# Patient Record
Sex: FEMALE | Race: WHITE | NOT HISPANIC OR LATINO | URBAN - METROPOLITAN AREA
[De-identification: names, ages, dates, MRNs, and addresses within clinical notes are randomized per-mention and may not be internally consistent; named-entity substitution may affect disease eponyms.]

---

## 2024-05-23 DIAGNOSIS — S92.109A: Primary | ICD-10-CM

## 2024-05-24 ENCOUNTER — HOSPITAL ENCOUNTER (OUTPATIENT)
Dept: RADIOLOGY | Facility: HOSPITAL | Age: 59
Discharge: HOME | End: 2024-05-24
Attending: STUDENT IN AN ORGANIZED HEALTH CARE EDUCATION/TRAINING PROGRAM
Payer: MEDICAID

## 2024-05-24 ENCOUNTER — OFFICE VISIT (OUTPATIENT)
Dept: ORTHOPEDICS | Facility: CLINIC | Age: 59
End: 2024-05-24
Payer: MEDICAID

## 2024-05-24 VITALS
HEART RATE: 87 BPM | WEIGHT: 154.31 LBS | SYSTOLIC BLOOD PRESSURE: 139 MMHG | BODY MASS INDEX: 25.71 KG/M2 | HEIGHT: 65 IN | DIASTOLIC BLOOD PRESSURE: 80 MMHG

## 2024-05-24 DIAGNOSIS — S92.109A: ICD-10-CM

## 2024-05-24 DIAGNOSIS — S92.109A: Primary | ICD-10-CM

## 2024-05-24 PROCEDURE — 99213 OFFICE O/P EST LOW 20 MIN: CPT | Mod: PBBFAC,25

## 2024-05-24 PROCEDURE — 73610 X-RAY EXAM OF ANKLE: CPT | Mod: TC,LT

## 2024-05-24 PROCEDURE — 73630 X-RAY EXAM OF FOOT: CPT | Mod: TC,LT

## 2024-05-24 RX ORDER — PIOGLITAZONEHYDROCHLORIDE 30 MG/1
1 TABLET ORAL DAILY
COMMUNITY

## 2024-05-24 RX ORDER — DICLOFENAC SODIUM 50 MG/1
50 TABLET, DELAYED RELEASE ORAL 2 TIMES DAILY PRN
COMMUNITY
Start: 2024-05-17

## 2024-05-24 RX ORDER — METFORMIN HYDROCHLORIDE 500 MG/1
1 TABLET ORAL 2 TIMES DAILY
COMMUNITY

## 2024-05-24 RX ORDER — METHOCARBAMOL 500 MG/1
1000 TABLET, FILM COATED ORAL 4 TIMES DAILY
COMMUNITY
Start: 2024-05-17

## 2024-05-24 NOTE — LETTER
May 24, 2024      [facility]  Critical access hospital0 [facility] [address]  Phone: [phone]       Patient: Sandra Birmingham   YOB: 1965  Date of Visit: 05/24/2024    To Whom It May Concern:    Natasha Birmingham  was at [facility] on 05/24/2024. The patient may return to work/school on 05/27/2024 with restrictions. Light duty for the next 4 weeks. If you have any questions or concerns, or if I can be of further assistance, please do not hesitate to contact me.    Sincerely,    Heriberto Jasso MD

## 2024-05-24 NOTE — PROGRESS NOTES
"Subjective:    Patient ID: Sandra Birmingham is a 58 y.o. female  who presented to [facility] for new visit.      Chief Complaint: Pain of the Left Ankle, Pain of the Left Foot, and Pain of the Right Ankle      History of Present Illness:    Sandra Birmingham presented today with left ankle pain that began 05/20/2024.  She was at her daughter's graduation was helping her daughter get out of the car when she slipped and fell in possibly a pot hole in the parking lot of the [employer].  She had an inversion injury of the left ankle and also her left ankle.  She was seen in the ED, imaging showed talar avulsion fracture, she was placed in a boot, given diclofenac p.o. and methocarbamol p.o. and referred to [facility] ortho.  Today pain is 8/10, located at the dorsal anterior talotibial joint.  Worse with walking.  Anti-inflammatories provide moderate relief.    Ankle/Foot Review of Systems:  Swelling?  no  Instability?  no  Mechanical sx?  no  <30 min AM stiffness? no  Limited ROM? yes  Fever/Chills? no       Objective:      Physical Exam:    /80   Pulse 87   Ht 5' 5" (1.651 m)   Wt 70 kg (154 lb 5.2 oz)   BMI 25.68 kg/m²     Ortho/SPM Exam    Appearance:  antalgic  FWB    Soft tissue swelling: Left: no Right: no  Effusion: Left:  Negative Right: Negative  Erythema: Left no Right: no  Ecchymosis: Left: no Right: no  Atrophy: Left: no Right: no    Palpation:  Ankle/Foot Tenderness: Left: non tender Right:  Anterior talonavicular joint.    Range of motion:  Ankle dorsiflexion (20 degrees):     Left: 20 Right: 20  Ankle Plantar flexion (50 degrees): Left 50 Right: 50  Subtalar inversion (5 degrees): Left: 5 Right 5  Subtalar eversion (5 degrees):  Left: 5 Right 5  Transverse/midtarsal: adduction (20 degrees): Left: 20 Right: 20  Transverse/midtarsal abduction (10 degrees): Left: 10 Right: 10    Strength:  Foot inversion/dorsiflexion (Deep Peroneal N. L4):Left: 5/5  Pain: " no Right: 5/5  Pain: yes  1st toe Dorsiflexion (Deep Peroneal N. L5): Left: 5/5  Pain: no Right: 5/5  Pain: no  Foot plantarflexion (Tibial N. S1): Left: 5/5  Pain: no Right: 5/5  Pain: no  Foot eversion (superficial peroneal L4-S1): Left: 5/5  Pain: no Right: 5/5  Pain: no      Special Tests:  Valles:  Left: Not performed     Right: Not performed   Anterior drawer: Left: Negative     Right: Negative   Talar tilt: Left: Negative      Right: Negative   External rotation stress (Kleiger's): Left: Negative  Right: Negative  Eversion stress: Left: Negative Right: Negative   Squeeze: Left: Negative  Right: Negative  Heel rise: Left: Not performed Right: Not performed  Too many toes sign: Left: Not performed Right: Not performed      General appearance: NAD  Peripheral pulses: normal bilaterally   Reflexes: Left: normal Right normal   Sensation: normal    Labs:  Last A1c: The patient doesn't have any registry metric data available     Imaging:   Previous images reviewed.  X-rays ordered and performed today: yes  # of views: 3 Laterality: left  My Interpretation:  Small soft tissue calcification at the distal superior anterior talus, could signify ligamentous injury versus previous enthesophyte.  No fractures or dislocations.      Assessment:        Encounter Diagnoses   Code Name Primary?    S92.109A Closed fracture of talus Yes        Plan:      MDM: Prior external referring provider notes reviewed. Prior external referring provider studies reviewed.   Dx:  Left ankle sprain, acute in moderate exacerbation.   Treatment Plan: Discussed with patient diagnosis, prognosis, and treatment recommendations. Education provided.    Recommend conservative treatment to include: avoidance of aggravating activity, significant modification of daily activities, hot/cold therapies, topical and oral medications, braces, HEP/PT/OT, and injections.   Rice 2 weeks  Walking boot 2-4 weeks.    Gentle range of motion as soon as pain allows.   Weeks 2-4.  Patient should be working towards full weight-bearing in the walking boot during this time period.  Ankle Brace for 2-4 weeks.  MRI if no improvement after 2 months  Imaging: radiological studies ordered and independently reviewed; discussed with patient; pending radiologist interpretation.   Weight Management: is paramount. Recommend at least 10 pounds weight loss if your BMI is 25-29.9. A BMI of <24.9 may provide further relief..   Activity: Activity as tolerated; HEP to include aerobic conditioning and strength training with non-painful activity. ROM/STG exercises. Proper footware; assistive devises to avoid limping.   Therapy:  Home range-of-motion exercises, demonstrated today.  Medication: CONTINUE previously prescribed medication see list . Please see your primary care physician for further refills.  RTC:  6 weeks.      Heriberto Jasso MD.  Note dictated using MModal.

## 2024-06-11 NOTE — PROGRESS NOTES
Faculty Attestation: Sandra Birmingham  was seen in Sports Medicine Clinic. Discussed with Dr. Jasso at the time of the visit. History of Present Illness, Physical Exam, and Assessment and Plan reviewed. Treatment plan is reasonable and appropriate. Compliance with treatment recommendations is important.  Radiology images independently reviewed and agree with radiologist interpretation.  No procedure was performed.     Nelly Crowley MD  Sports Medicine

## 2024-07-18 ENCOUNTER — OFFICE VISIT (OUTPATIENT)
Dept: ORTHOPEDICS | Facility: CLINIC | Age: 59
End: 2024-07-18
Payer: MEDICAID

## 2024-07-18 VITALS
WEIGHT: 155.88 LBS | HEIGHT: 65 IN | BODY MASS INDEX: 25.97 KG/M2 | HEART RATE: 77 BPM | DIASTOLIC BLOOD PRESSURE: 82 MMHG | TEMPERATURE: 98 F | SYSTOLIC BLOOD PRESSURE: 136 MMHG

## 2024-07-18 DIAGNOSIS — S93.402D INVERSION SPRAIN OF LEFT ANKLE, SUBSEQUENT ENCOUNTER: Primary | ICD-10-CM

## 2024-07-18 DIAGNOSIS — M75.101 ROTATOR CUFF SYNDROME OF RIGHT SHOULDER: ICD-10-CM

## 2024-07-18 PROCEDURE — 99213 OFFICE O/P EST LOW 20 MIN: CPT | Mod: PBBFAC

## 2024-07-18 RX ORDER — BENAZEPRIL HYDROCHLORIDE 5 MG/1
5 TABLET ORAL
COMMUNITY

## 2024-07-18 NOTE — PROGRESS NOTES
"Subjective:    Patient ID: Sandra Birmingham is a 59 y.o. female  who presented to [facility] for follow up.      Chief Complaint: Pain of the Left Ankle      History of Present Illness:     Sandra Birmingham presented today for follow up of left lateral ankle sprain and right shoulder pain. She has not transitioned to an ankle brace CS she has been wearing her boot.  She states overall her ankle pain has improved significantly, still slightly tender over the lateral ankle, pain 3-4/10.  Denies any new injuries.  She also complains of posterior shoulder pain, 3/10, worse on abduction and external rotation.  States she injured it at the same time she injured her ankle when she fell she grabbed onto the car door handle.    Ankle/Foot Review of Systems:  Swelling?  no  Instability?  no  Mechanical sx?  no  <30 min AM stiffness? no  Limited ROM? no  Fever/Chills? no       Objective:      Physical Exam:    /82   Pulse 77   Temp 97.7 °F (36.5 °C)   Ht 5' 5" (1.651 m)   Wt 70.7 kg (155 lb 13.8 oz)   BMI 25.94 kg/m²     Ortho/SPM Exam    Appearance:  Normal gait/station  FWB      Soft tissue swelling: Left: no Right: no  Effusion: Left:  Negative Right: Negative  Erythema: Left no Right: no  Ecchymosis: Left: no Right: no  Atrophy: Left: no Right: no    Palpation:  Ankle/Foot Tenderness: Left: anterior talofibular ligament (ATFL) Right: non tender.  Right shoulder:  Posterior deltoid.    Range of motion:  Full range of motion of the right shoulder on abduction, flexion internal and external rotation.  Ankle dorsiflexion (20 degrees):     Left: 20 Right: 20  Ankle Plantar flexion (50 degrees): Left 50 Right: 50  Subtalar inversion (5 degrees): Left: 5 Right 5  Subtalar eversion (5 degrees):  Left: 5 Right 5  Transverse/midtarsal: adduction (20 degrees): Left: 20 Right: 20  Transverse/midtarsal abduction (10 degrees): Left: 10 Right: 10    Strength:  Right " Shoulder strength: 5/5 flexion abduction, internal and external rotation.  She does have pain on resisted external rotation.  Foot inversion/dorsiflexion (Deep Peroneal N. L4):Left: 5/5  Pain: no Right: 5/5  Pain: no  1st toe Dorsiflexion (Deep Peroneal N. L5): Left: 5/5  Pain: no Right: 5/5  Pain: no  Foot plantarflexion (Tibial N. S1): Left: 5/5  Pain: no Right: 5/5  Pain: no  Foot eversion (superficial peroneal L4-S1): Left: 5/5  Pain: no Right: 5/5  Pain: no    Special Tests:  Right shoulder:   Positive: Horn blower; juanito's pain lateralizes to the posterior shoulder.  Negative [place], cross-body abduction, Speed's, Drop-arm, [place]'s.    Left ankle:  Valles:  Left: Not performed     Right: Not performed   Anterior drawer: Left: Negative     Right: Negative   Talar tilt: Left: Positive      Right: Negative   External rotation stress (Kleiger's): Left: Negative  Right: Negative  Eversion stress: Left: Negative Right: Negative   Squeeze: Left: Negative  Right: Negative  Heel rise: Left: Not performed Right: Not performed  Too many toes sign: Left: Not performed Right: Not performed      General appearance: NAD  Peripheral pulses: normal bilaterally   Reflexes: Left: normal Right normal   Sensation: normal    Labs:  Last A1c: The patient doesn't have any registry metric data available     Imaging:   Previous images reviewed.  X-rays ordered and performed today: no  Assessment:        Encounter Diagnoses   Code Name Primary?    S93.402D Inversion sprain of left ankle, subsequent encounter Yes    M75.101 Rotator cuff syndrome of right shoulder         Plan:      Dx:  Left lateral ankle sprain, resolving.  Right rotator cuff syndrome, new problem.  Treatment Plan: Discussed with patient diagnosis, prognosis, and treatment recommendations. Education provided.  Recommend conservative treatment to include: avoidance of aggravating activity, significant modification of daily activities, hot/cold therapies, topical and  oral medications, braces, HEP/PT/OT, and injections.   For the left lateral ankle sprain, recommend transitioning to an ankle brace for the next month and then weaning out of it over with a 2 weeks after that.  Follow up p.r.n.  For the right rotator cuff syndrome, suspect infraspinatus verse teres minor, symptoms are mild, strength and range of motion are preserved.  Recommend home exercise program.  Follow up p.r.n..  Imaging: prior radiological studies independently reviewed; discussed with patient; agree with radiologist interpretation.   Weight Management: is paramount. Recommend at least 10 pounds weight loss if your BMI is 25-29.9. A BMI of <24.9 may provide further relief..   Procedure:  Consider shoulder CSI in the future if no improvement with conservative management.  Activity: Activity as tolerated; HEP to include aerobic conditioning and strength training with non-painful activity. ROM/STG exercises. Proper footware; assistive devises to avoid limping.   Therapy:  Home exercise program.  Medication: CONTINUE previously prescribed medication see list. . Please see your primary care physician for further refills.  RTC: PRN; call if any issues.      Heriberto Jasso MD.  Note dictated using MModal.

## 2024-07-19 NOTE — PROGRESS NOTES
Faculty Attestation: Sandra Birmingham  was seen in Sports Medicine Clinic. Discussed with Dr. Jasso at the time of the visit. History of Present Illness, Physical Exam, and Assessment and Plan reviewed. Treatment plan is reasonable and appropriate. Compliance with treatment recommendations is important.  Radiology images independently reviewed and agree with radiologist interpretation. No procedure was performed.     Aicha Alaniz MD  Sports Medicine      Patient Type: Established patient to [facility]  Problem Type: New condition to [facility]  Condition: Acute Condition - recent or new complicated injury/illness (4)

## 2025-02-25 ENCOUNTER — ANESTHESIA EVENT (OUTPATIENT)
Dept: SURGERY | Facility: HOSPITAL | Age: 60
End: 2025-02-25
Payer: MEDICAID

## 2025-02-25 RX ORDER — PHENYLEPHRINE HYDROCHLORIDE 25 MG/ML
1 SOLUTION/ DROPS OPHTHALMIC
OUTPATIENT
Start: 2025-02-25 | End: 2025-02-25

## 2025-02-25 RX ORDER — ACETAMINOPHEN 500 MG
1000 TABLET ORAL
OUTPATIENT
Start: 2025-02-25 | End: 2025-02-25

## 2025-02-25 RX ORDER — TROPICAMIDE 10 MG/ML
1 SOLUTION/ DROPS OPHTHALMIC
OUTPATIENT
Start: 2025-02-25 | End: 2025-02-25

## 2025-02-25 RX ORDER — CYCLOPENTOLATE HYDROCHLORIDE 10 MG/ML
1 SOLUTION/ DROPS OPHTHALMIC
OUTPATIENT
Start: 2025-02-25 | End: 2025-02-25

## 2025-02-25 NOTE — ANESTHESIA PREPROCEDURE EVALUATION
02/25/2025  Sandra Birmingham is a 59 y.o., female.      Pre-op Assessment    I have reviewed the Patient Summary Reports.     I have reviewed the Nursing Notes. I have reviewed the NPO Status.   I have reviewed the Medications.     Review of Systems  Anesthesia Hx:  No problems with previous Anesthesia   History of prior surgery of interest to airway management or planning:  Previous anesthesia: General           Hematology/Oncology:  Hematology Normal   Oncology Normal                                   EENT/Dental:   Right retinal detachment Eyes:             Eye Disease:  Diabetic Retinopathy                   Cardiovascular:  Exercise tolerance: good   Hypertension              ECG has been reviewed.                      Hypertension, Essential Hypertension Denies Accelerated Hypertension         Pulmonary:  Pulmonary Normal                       Renal/:  Renal/ Normal                 Hepatic/GI:  Hepatic/GI Normal                    Musculoskeletal:  Musculoskeletal Normal                Neurological:  Neurology Normal                                      Endocrine:  Diabetes, type 2    Diabetes, Type 2 Diabetes                      Dermatological:  Skin Normal    Psych:  Psychiatric Normal                    Physical Exam  General: Well nourished, Cooperative, Alert and Oriented    Airway:  Mallampati: II   Mouth Opening: Normal  TM Distance: Normal  Tongue: Normal  Neck ROM: Normal ROM    Dental:  Intact    Chest/Lungs:  Clear to auscultation, Normal Respiratory Rate    Heart:  Rate: Normal  Rhythm: Regular Rhythm        Anesthesia Plan  Type of Anesthesia, risks & benefits discussed:    Anesthesia Type: Gen Supraglottic Airway  Intra-op Monitoring Plan: Standard ASA Monitors  Post Op Pain Control Plan: multimodal analgesia and IV/PO Opioids PRN  Induction:  IV  Airway Plan: ,  Post-Induction  Informed Consent: Informed consent signed with the Patient and all parties understand the risks and agree with anesthesia plan.  All questions answered. Patient consented to blood products? No  ASA Score: 3  Day of Surgery Review of History & Physical: H&P Update referred to the surgeon/provider.I have interviewed and examined the patient. I have reviewed the patient's H&P dated: 3/13/2025. There are no significant changes.     Ready For Surgery From Anesthesia Perspective.     .

## 2025-02-27 ENCOUNTER — ANESTHESIA (OUTPATIENT)
Dept: SURGERY | Facility: HOSPITAL | Age: 60
End: 2025-02-27
Payer: MEDICAID

## 2025-03-13 ENCOUNTER — HOSPITAL ENCOUNTER (OUTPATIENT)
Facility: HOSPITAL | Age: 60
Discharge: HOME | End: 2025-03-13
Attending: OPHTHALMOLOGY | Admitting: OPHTHALMOLOGY
Payer: MEDICAID

## 2025-03-13 DIAGNOSIS — R94.39 ABNORMAL NUCLEAR STRESS TEST: ICD-10-CM

## 2025-03-13 LAB
POCT GLUCOSE: 135 MG/DL (ref 70–110)
POCT GLUCOSE: 141 MG/DL (ref 70–110)

## 2025-03-13 PROCEDURE — 25000003 PHARM REV CODE 250: Performed by: OPHTHALMOLOGY

## 2025-03-13 PROCEDURE — 82962 GLUCOSE BLOOD TEST: CPT | Performed by: OPHTHALMOLOGY

## 2025-03-13 PROCEDURE — 71000016 HC POSTOP RECOV ADDL HR: Performed by: OPHTHALMOLOGY

## 2025-03-13 PROCEDURE — 63600175 PHARM REV CODE 636 W HCPCS: Performed by: OPHTHALMOLOGY

## 2025-03-13 PROCEDURE — 63600175 PHARM REV CODE 636 W HCPCS: Performed by: NURSE ANESTHETIST, CERTIFIED REGISTERED

## 2025-03-13 PROCEDURE — 71000033 HC RECOVERY, INTIAL HOUR: Performed by: OPHTHALMOLOGY

## 2025-03-13 PROCEDURE — 37000008 HC ANESTHESIA 1ST 15 MINUTES: Performed by: OPHTHALMOLOGY

## 2025-03-13 PROCEDURE — 25000003 PHARM REV CODE 250: Performed by: NURSE ANESTHETIST, CERTIFIED REGISTERED

## 2025-03-13 PROCEDURE — 37000009 HC ANESTHESIA EA ADD 15 MINS: Performed by: OPHTHALMOLOGY

## 2025-03-13 PROCEDURE — 27201423 OPTIME MED/SURG SUP & DEVICES STERILE SUPPLY: Performed by: OPHTHALMOLOGY

## 2025-03-13 PROCEDURE — 36000707: Performed by: OPHTHALMOLOGY

## 2025-03-13 PROCEDURE — 71000015 HC POSTOP RECOV 1ST HR: Performed by: OPHTHALMOLOGY

## 2025-03-13 PROCEDURE — 25000003 PHARM REV CODE 250

## 2025-03-13 PROCEDURE — 36000706: Performed by: OPHTHALMOLOGY

## 2025-03-13 RX ORDER — ONDANSETRON HYDROCHLORIDE 2 MG/ML
INJECTION, SOLUTION INTRAVENOUS
Status: DISCONTINUED | OUTPATIENT
Start: 2025-03-13 | End: 2025-03-13

## 2025-03-13 RX ORDER — TRIAMCINOLONE ACETONIDE 40 MG/ML
INJECTION, SUSPENSION INTRA-ARTICULAR; INTRAMUSCULAR
Status: DISCONTINUED | OUTPATIENT
Start: 2025-03-13 | End: 2025-03-13 | Stop reason: HOSPADM

## 2025-03-13 RX ORDER — HYDROCODONE BITARTRATE AND ACETAMINOPHEN 5; 325 MG/1; MG/1
1 TABLET ORAL
Status: DISCONTINUED | OUTPATIENT
Start: 2025-03-13 | End: 2025-03-13 | Stop reason: HOSPADM

## 2025-03-13 RX ORDER — KETOROLAC TROMETHAMINE 30 MG/ML
30 INJECTION, SOLUTION INTRAMUSCULAR; INTRAVENOUS ONCE
Status: COMPLETED | OUTPATIENT
Start: 2025-03-13 | End: 2025-03-13

## 2025-03-13 RX ORDER — MEPERIDINE HYDROCHLORIDE 25 MG/ML
12.5 INJECTION INTRAMUSCULAR; INTRAVENOUS; SUBCUTANEOUS ONCE AS NEEDED
Status: DISCONTINUED | OUTPATIENT
Start: 2025-03-13 | End: 2025-03-13 | Stop reason: HOSPADM

## 2025-03-13 RX ORDER — MIDAZOLAM HYDROCHLORIDE 1 MG/ML
INJECTION INTRAMUSCULAR; INTRAVENOUS
Status: DISCONTINUED | OUTPATIENT
Start: 2025-03-13 | End: 2025-03-13

## 2025-03-13 RX ORDER — SODIUM CHLORIDE 9 MG/ML
INJECTION, SOLUTION INTRAVENOUS CONTINUOUS
Status: DISCONTINUED | OUTPATIENT
Start: 2025-03-13 | End: 2025-03-13 | Stop reason: HOSPADM

## 2025-03-13 RX ORDER — ACETAMINOPHEN 500 MG
1000 TABLET ORAL EVERY 4 HOURS PRN
Status: DISCONTINUED | OUTPATIENT
Start: 2025-03-13 | End: 2025-03-13 | Stop reason: HOSPADM

## 2025-03-13 RX ORDER — HYDROCODONE BITARTRATE AND ACETAMINOPHEN 7.5; 325 MG/1; MG/1
1 TABLET ORAL EVERY 4 HOURS PRN
Status: DISCONTINUED | OUTPATIENT
Start: 2025-03-13 | End: 2025-03-13 | Stop reason: HOSPADM

## 2025-03-13 RX ORDER — TOBRAMYCIN AND DEXAMETHASONE 3; 1 MG/ML; MG/ML
SUSPENSION/ DROPS OPHTHALMIC
Status: DISCONTINUED | OUTPATIENT
Start: 2025-03-13 | End: 2025-03-13 | Stop reason: HOSPADM

## 2025-03-13 RX ORDER — FENTANYL CITRATE 50 UG/ML
25 INJECTION, SOLUTION INTRAMUSCULAR; INTRAVENOUS EVERY 5 MIN PRN
Status: DISCONTINUED | OUTPATIENT
Start: 2025-03-13 | End: 2025-03-13 | Stop reason: HOSPADM

## 2025-03-13 RX ORDER — GLUCAGON 1 MG
1 KIT INJECTION
Status: DISCONTINUED | OUTPATIENT
Start: 2025-03-13 | End: 2025-03-13 | Stop reason: HOSPADM

## 2025-03-13 RX ORDER — ONDANSETRON HYDROCHLORIDE 2 MG/ML
4 INJECTION, SOLUTION INTRAVENOUS DAILY PRN
Status: DISCONTINUED | OUTPATIENT
Start: 2025-03-13 | End: 2025-03-13 | Stop reason: HOSPADM

## 2025-03-13 RX ORDER — FENTANYL CITRATE 50 UG/ML
INJECTION, SOLUTION INTRAMUSCULAR; INTRAVENOUS
Status: DISCONTINUED | OUTPATIENT
Start: 2025-03-13 | End: 2025-03-13

## 2025-03-13 RX ORDER — POVIDONE-IODINE 5 %
SOLUTION, NON-ORAL OPHTHALMIC (EYE)
Status: DISCONTINUED | OUTPATIENT
Start: 2025-03-13 | End: 2025-03-13 | Stop reason: HOSPADM

## 2025-03-13 RX ORDER — DIPHENHYDRAMINE HYDROCHLORIDE 50 MG/ML
12.5 INJECTION, SOLUTION INTRAMUSCULAR; INTRAVENOUS ONCE AS NEEDED
Status: DISCONTINUED | OUTPATIENT
Start: 2025-03-13 | End: 2025-03-13 | Stop reason: HOSPADM

## 2025-03-13 RX ORDER — INDOCYANINE GREEN AND WATER 25 MG
KIT INJECTION
Status: DISCONTINUED | OUTPATIENT
Start: 2025-03-13 | End: 2025-03-13 | Stop reason: HOSPADM

## 2025-03-13 RX ORDER — LIDOCAINE HYDROCHLORIDE 20 MG/ML
INJECTION INTRAVENOUS
Status: DISCONTINUED | OUTPATIENT
Start: 2025-03-13 | End: 2025-03-13

## 2025-03-13 RX ORDER — ACETAMINOPHEN 500 MG
1000 TABLET ORAL
Status: COMPLETED | OUTPATIENT
Start: 2025-03-13 | End: 2025-03-13

## 2025-03-13 RX ORDER — PROMETHAZINE HYDROCHLORIDE 25 MG/ML
25 INJECTION, SOLUTION INTRAMUSCULAR; INTRAVENOUS EVERY 6 HOURS PRN
Status: DISCONTINUED | OUTPATIENT
Start: 2025-03-13 | End: 2025-03-13 | Stop reason: HOSPADM

## 2025-03-13 RX ORDER — CYCLOPENTOLATE HYDROCHLORIDE 10 MG/ML
1 SOLUTION/ DROPS OPHTHALMIC
Status: COMPLETED | OUTPATIENT
Start: 2025-03-13 | End: 2025-03-13

## 2025-03-13 RX ORDER — TROPICAMIDE 10 MG/ML
1 SOLUTION/ DROPS OPHTHALMIC
Status: COMPLETED | OUTPATIENT
Start: 2025-03-13 | End: 2025-03-13

## 2025-03-13 RX ORDER — PHENYLEPHRINE HYDROCHLORIDE 25 MG/ML
1 SOLUTION/ DROPS OPHTHALMIC
Status: COMPLETED | OUTPATIENT
Start: 2025-03-13 | End: 2025-03-13

## 2025-03-13 RX ORDER — PROPOFOL 10 MG/ML
VIAL (ML) INTRAVENOUS
Status: DISCONTINUED | OUTPATIENT
Start: 2025-03-13 | End: 2025-03-13

## 2025-03-13 RX ADMIN — TROPICAMIDE 1 DROP: 10 SOLUTION/ DROPS OPHTHALMIC at 06:03

## 2025-03-13 RX ADMIN — FENTANYL CITRATE 25 MCG: 50 INJECTION, SOLUTION INTRAMUSCULAR; INTRAVENOUS at 07:03

## 2025-03-13 RX ADMIN — KETOROLAC TROMETHAMINE 30 MG: 30 INJECTION, SOLUTION INTRAMUSCULAR at 08:03

## 2025-03-13 RX ADMIN — SODIUM CHLORIDE: 9 INJECTION, SOLUTION INTRAVENOUS at 06:03

## 2025-03-13 RX ADMIN — CYCLOPENTOLATE HYDROCHLORIDE 1 DROP: 10 SOLUTION/ DROPS OPHTHALMIC at 06:03

## 2025-03-13 RX ADMIN — ACETAMINOPHEN 1000 MG: 500 TABLET ORAL at 06:03

## 2025-03-13 RX ADMIN — LIDOCAINE HYDROCHLORIDE 50 MG: 20 INJECTION, SOLUTION INTRAVENOUS at 07:03

## 2025-03-13 RX ADMIN — PHENYLEPHRINE HYDROCHLORIDE 1 DROP: 25 SOLUTION/ DROPS OPHTHALMIC at 06:03

## 2025-03-13 RX ADMIN — MIDAZOLAM 2 MG: 1 INJECTION INTRAMUSCULAR; INTRAVENOUS at 07:03

## 2025-03-13 RX ADMIN — ONDANSETRON 4 MG: 2 INJECTION INTRAMUSCULAR; INTRAVENOUS at 07:03

## 2025-03-13 RX ADMIN — PROPOFOL 120 MG: 10 INJECTION, EMULSION INTRAVENOUS at 07:03

## 2025-03-13 NOTE — DISCHARGE INSTRUCTIONS
Scleral Buckle//Vitrectomy  Care After    Refer to this sheet in the next few weeks. These instructions provide you with information on caring for yourself after your procedure. Your caregiver may also give you more specific instructions. Your treatment has been planned according to current medical practices, but problems sometimes occur. Call your caregiver if you have any problems or questions after your procedure.    Home Care Instructions  If you are instructed to stay in a certain position for a certain amount of time, it is important to do so. Positions may include sitting up or lying on either side. The reasons for certain positioning depends on the reason you had the surgery and the type of vitrectomy performed. Ask your caregiver if you have to remain in a certain position for a certain amount of time.    Wear your eye patch for the first day. Dr. Ennis will remove the patch the next morning after surgery.  Keep the area around your eye clean and dry.  Put any eyedrops in your eyes as directed by your caregiver after seeing Dr. Ennis the day after surgery: TOBRADEX- one drop to affected eye 4 times a day, ex: with breakfast, lunch, dinner, and before bed.    Wear the plastic shield over your eye when you sleep for the first day. This is to protect the eye from being accidentally bumped or jabbed, or having too much pressure put on it.  Avoid any strenuous physical activity for as long as directed by your caregiver. This includes bending over, lifting anything over 5 pounds (2.3 kg), or straining. Talk to your caregiver about when it may be safe to resume sexual activity.  If any of your regular medicines were stopped before surgery, carefully follow your caregiver's instructions about which medicines to restart and when to restart them. You may be instructed not to use blood thinners or aspirin.  Continue with your normal diet unless directed otherwise by your caregiver. If you are diabetic, stay on your  "diabetic diet, or use your insulin as directed by your caregiver.  It is okay to use your other eye for reading and watching television.  Do not drive a car or use contact lenses until your caregiver says it is okay.      Dr. Ennis will remove patch the morning after surgery and will begin the following medication as instructed.    Medications:  Tobradex- 1 drop in operative eye, 4 times a day (sTART fRIDAY 03/14/25 AFTER DR. ENNIS VISIT    After surgery instructions:  Maintain the following positional restrictions until cleared by your surgeon: LIE ON LEFT SIDE WITH HEAD FACING LEFT OR FACE DOWN.      Avoid any heavy lifting, strenuous activity, or driving until cleared to do so by your surgeon.  Avoid any "dirty" activities which may lead to eye infection, such as gardening or scrubbing floors.  Do not rub the operative eye.    Notify your surgeon of any:  signs of bleeding or infection  excessive pain or nausea that is not relieved with medication  worsening or sudden loss of vision    SEEK MEDICAL CARE IF:  Your eye becomes very red or painful.  You develop any pus or discharge from the eye.  You have chills.  Your eyelids on either eye become swollen or stuck shut.    SEEK IMMEDIATE MEDICAL CARE IF:    You have a fever.    You notice a change in vision in either eye.    You see more floaters or spots in front of your vision.    Part of your side vision is black and you cannot see through it. It may feel like a shade is being pulled toward the center of your vision from any direction-Leave patch on tonight. Keep it clean and dry.   WEAR ARRON BRACELET AND KEEP ARRON CARD WITH YOU.  "

## 2025-03-13 NOTE — OP NOTE
[facility]                         [address] [address]     PATIENT NAME: Sandra Birmingham   YOB: 1965   CSN: [id number]        MRN: [id number]   ADMIT DATE: 3/13/2025     PHYSICIAN:         Piter Ennis MD                           OPERATIVE REPORT     DATE OF SURGERY:03/13/2025       SURGEON:  Piter Ennis MD     PREOPERATIVE DIAGNOSIS:  Full Thickness Macular Hole Right Eye     POSTOPERATIVE DIAGNOSIS:  Full Thickness Macular Hole Right Eye    PROCEDURE:  Pars plana vitrectomy with internal limiting membrane peel right eye.     ANESTHESIA:  General.     ESTIMATED BLOOD LOSS:  Less than 5 cc.     COMPLICATIONS:  None.     PROCEDURE INDICATIONS: Sandra Birmingham  has a history of a Full Thickness Macular Hole Right Eye.     PROCEDURE DESCRIPTION:  The patient was taken to the operative theater where   general anesthesia was begun.  The right eye was prepped and draped in the   normal sterile fashion and a lid speculum applied.  A standard 3-port 25-gauge   pars plana vitrectomy was performed with all trocars placed 3.5 mm from the   surgical limbus.  A core vitrectomy was performed, including removal of the   posterior hyaloid out to the peripheral retina.  Kenalog was injected into the vitreous cavity to aid in visualization   of the vitreous and a posterior vitreous detachment was created using the vitrectomy cutter.  ICG dye was used to stain the internal limiting membrane.  The membrane was the peeled from the macular surface using an ILM forcep.   The peripheral retina was examined with scleral depression and no retinal breaks were identified.   A fluid air exchange was the performed followed by an air gas exchange with 20%SF6 gas.  All instruments were removed from the eye and all sclerotomies   were massaged closed with cotton tip swabs.  Several drops of TobraDex ophthalmic  solution were   applied to the eye.  The postoperative intraocular pressure was 15 mmHg.  The   lid speculum and eye drape were then removed, and the eye was covered with a   gauze patch and a Bradford shield.  The patient was then transported to the   postoperative care unit for recovery.     DISCHARGE CONDITION:  Good.     DISPOSITION:  Home with followup with Dr. Ennis the following day.  This patient   tolerated the procedure well.     Sandra Birmingham is discharged to home.           ______________________________  Piter Ennis MD

## 2025-03-13 NOTE — ANESTHESIA PROCEDURE NOTES
Intubation    Date/Time: 3/13/2025 7:10 AM    Performed by: Gustavo Brown CRNA  Authorized by: Gustavo Brown CRNA    Intubation:     Induction:  Intravenous    Intubated:  Postinduction    Mask Ventilation:  Not attempted    Attempts:  1    Attempted By:  CRNA    Difficult Airway Encountered?: No      Complications:  None    Airway Device:  Supraglottic airway/LMA    Airway Device Size:  3.0    Style/Cuff Inflation:  Cuffed (inflated to minimal occlusive pressure)    Placement Verified By:  Capnometry    Complicating Factors:  None    Findings Post-Intubation:  BS equal bilateral

## 2025-03-13 NOTE — ANESTHESIA POSTPROCEDURE EVALUATION
Anesthesia Post Evaluation    Patient: Sandra Birmingham    Procedure(s) Performed: Procedure(s) (LRB):  PPV WITH MH REPAIR- OD (Right)    Final Anesthesia Type: general      Patient location during evaluation: OPS  Patient participation: Yes- Able to Participate  Level of consciousness: awake and alert and oriented  Post-procedure vital signs: reviewed and stable  Pain management: adequate  Airway patency: patent    PONV status at discharge: No PONV  Anesthetic complications: no      Cardiovascular status: stable  Respiratory status: unassisted, spontaneous ventilation and room air  Hydration status: euvolemic  Follow-up not needed.              Vitals Value Taken Time   /71 03/13/25 08:27   Temp 36.2 °C (97.2 °F) 03/13/25 08:25   Pulse 69 03/13/25 08:27   Resp 13 03/13/25 08:26   SpO2 97 % 03/13/25 08:27   Vitals shown include unfiled device data.      Event Time   Out of Recovery 08:31:00         Pain/Jade Score: Pain Rating Prior to Med Admin: 0 (3/13/2025  8:01 AM)  Jade Score: 10 (3/13/2025  8:32 AM)

## 2025-03-13 NOTE — PLAN OF CARE
VITAL SIGNS STABLE, PAIN AND NAUSEA WELL CONTROLLED. OK TO TRANSFER TO PHASE 2 PER  DEENA AYOUB CRNA

## 2025-03-13 NOTE — DISCHARGE SUMMARY
[facility]                         [address] [address]     PATIENT NAME: Sandra Birmingham   YOB: 1965   CSN: [id number]        MRN: [id number]   ADMIT DATE: 3/13/2025   PHYSICIAN:         Piter Ennis MD  Discharge Date: 3/13/2025  Discharge Note        Sandra Birmingham had eye surgery with Piter Ennis on 3/13/2025. Sandra Birmingham tolerated the procedure will and is discharged in good condition. Sandra Birmingham is discharged to home with follow up with Dr. Ennis the following day.

## 2025-03-13 NOTE — TRANSFER OF CARE
"Anesthesia Transfer of Care Note    Patient: Sandra Birmingham    Procedure(s) Performed: Procedure(s) (LRB):  PPV WITH MH REPAIR- OD (Right)    Patient location: PACU    Anesthesia Type: general    Transport from OR: Transported from OR on room air with adequate spontaneous ventilation    Post pain: adequate analgesia    Post assessment: no apparent anesthetic complications    Post vital signs: stable    Level of consciousness: sedated    Nausea/Vomiting: no nausea/vomiting    Complications: none    Transfer of care protocol was followedComments: /78  HR 86  RR 18  O2 Sat 100  Temp 36      Last vitals: Visit Vitals  BP (!) 184/89   Pulse 81   Temp 36.7 °C (98 °F) (Oral)   Resp 20   Ht 5' 5" (1.651 m)   Wt 69.8 kg (153 lb 14.1 oz)   LMP  (LMP Unknown)   SpO2 99%   Breastfeeding No   BMI 25.61 kg/m²     "

## 2025-03-17 VITALS
TEMPERATURE: 98 F | HEART RATE: 79 BPM | HEIGHT: 65 IN | SYSTOLIC BLOOD PRESSURE: 131 MMHG | BODY MASS INDEX: 25.64 KG/M2 | WEIGHT: 153.88 LBS | DIASTOLIC BLOOD PRESSURE: 69 MMHG | OXYGEN SATURATION: 99 % | RESPIRATION RATE: 18 BRPM

## (undated) DEVICE — BETADINE OPTHALMIC SOL 5% 30ML

## (undated) DEVICE — SYR 50CC LL

## (undated) DEVICE — MICROFORCEP ULTRA PEEL 27GA

## (undated) DEVICE — DRAPE MICROSCOPE HEAD COVER

## (undated) DEVICE — SET BIOM OPTIC HD LENS 175MM

## (undated) DEVICE — GLOVE SENSICARE PI MICRO 8

## (undated) DEVICE — SUPPORT ULNA NERVE PROTECTOR

## (undated) DEVICE — SOL BALANCED SALT 500ML

## (undated) DEVICE — TANK GAS ISPAN 125GR FOR SF6

## (undated) DEVICE — POSITIONER HEAD ADULT

## (undated) DEVICE — SOL BSS BALANCED SALT

## (undated) DEVICE — Device

## (undated) DEVICE — FILTER .2MCRN 45CC STRL DISP

## (undated) DEVICE — SPONGE WEC CEL SPEARS

## (undated) DEVICE — SPONGE COTTON TRAY 4X4IN

## (undated) DEVICE — PACK EVA VITRECTOMY INPUT 25G

## (undated) DEVICE — BENZOIN TINCTURE 0.66ML

## (undated) DEVICE — NDL BLNT STD 1.5IN 18G